# Patient Record
Sex: MALE | Race: WHITE | Employment: OTHER | URBAN - METROPOLITAN AREA
[De-identification: names, ages, dates, MRNs, and addresses within clinical notes are randomized per-mention and may not be internally consistent; named-entity substitution may affect disease eponyms.]

---

## 2019-04-21 ENCOUNTER — HOSPITAL ENCOUNTER (INPATIENT)
Age: 74
LOS: 1 days | Discharge: HOME | DRG: 389 | End: 2019-04-21
Attending: EMERGENCY MEDICINE | Admitting: SURGERY
Payer: MEDICARE

## 2019-04-21 ENCOUNTER — APPOINTMENT (OUTPATIENT)
Dept: GENERAL RADIOLOGY | Age: 74
DRG: 389 | End: 2019-04-21
Attending: STUDENT IN AN ORGANIZED HEALTH CARE EDUCATION/TRAINING PROGRAM
Payer: MEDICARE

## 2019-04-21 ENCOUNTER — APPOINTMENT (OUTPATIENT)
Dept: CT IMAGING | Age: 74
DRG: 389 | End: 2019-04-21
Attending: EMERGENCY MEDICINE
Payer: MEDICARE

## 2019-04-21 VITALS
TEMPERATURE: 98.1 F | RESPIRATION RATE: 18 BRPM | WEIGHT: 205 LBS | BODY MASS INDEX: 24.21 KG/M2 | SYSTOLIC BLOOD PRESSURE: 142 MMHG | OXYGEN SATURATION: 96 % | DIASTOLIC BLOOD PRESSURE: 80 MMHG | HEIGHT: 77 IN | HEART RATE: 90 BPM

## 2019-04-21 DIAGNOSIS — K56.609 SMALL BOWEL OBSTRUCTION (HCC): Primary | ICD-10-CM

## 2019-04-21 PROBLEM — D72.829 LEUCOCYTOSIS: Status: ACTIVE | Noted: 2019-04-21

## 2019-04-21 PROBLEM — R11.2 N&V (NAUSEA AND VOMITING): Status: ACTIVE | Noted: 2019-04-21

## 2019-04-21 PROBLEM — D3A.098 CARCINOID TUMOR OF ABDOMEN: Status: ACTIVE | Noted: 2019-04-21

## 2019-04-21 PROBLEM — R10.84 GENERALIZED ABDOMINAL PAIN: Status: ACTIVE | Noted: 2019-04-21

## 2019-04-21 PROBLEM — D68.9 COAGULOPATHY (HCC): Status: ACTIVE | Noted: 2019-04-21

## 2019-04-21 LAB
ALBUMIN SERPL-MCNC: 4 G/DL (ref 3.2–4.6)
ALBUMIN/GLOB SERPL: 1 {RATIO}
ALP SERPL-CCNC: 74 U/L (ref 50–136)
ALT SERPL-CCNC: 23 U/L (ref 12–65)
ANION GAP SERPL CALC-SCNC: 10 MMOL/L
AST SERPL-CCNC: 16 U/L (ref 15–37)
BASOPHILS # BLD: 0.1 K/UL (ref 0–0.2)
BASOPHILS NFR BLD: 0 % (ref 0–2)
BILIRUB SERPL-MCNC: 1.2 MG/DL (ref 0.2–1.1)
BUN SERPL-MCNC: 24 MG/DL (ref 8–23)
CALCIUM SERPL-MCNC: 9.8 MG/DL (ref 8.3–10.4)
CHLORIDE SERPL-SCNC: 103 MMOL/L (ref 98–107)
CO2 SERPL-SCNC: 25 MMOL/L (ref 21–32)
CREAT SERPL-MCNC: 1.15 MG/DL (ref 0.8–1.5)
DIFFERENTIAL METHOD BLD: ABNORMAL
EOSINOPHIL # BLD: 0.1 K/UL (ref 0–0.8)
EOSINOPHIL NFR BLD: 0 % (ref 0.5–7.8)
ERYTHROCYTE [DISTWIDTH] IN BLOOD BY AUTOMATED COUNT: 13.4 % (ref 11.9–14.6)
GLOBULIN SER CALC-MCNC: 4 G/DL (ref 2.3–3.5)
GLUCOSE SERPL-MCNC: 181 MG/DL (ref 65–100)
HCT VFR BLD AUTO: 43.9 % (ref 41.1–50.3)
HGB BLD-MCNC: 14.4 G/DL (ref 13.6–17.2)
IMM GRANULOCYTES # BLD AUTO: 0.1 K/UL (ref 0–0.5)
IMM GRANULOCYTES NFR BLD AUTO: 0 % (ref 0–5)
LACTATE BLD-SCNC: 1.2 MMOL/L (ref 0.5–1.9)
LIPASE SERPL-CCNC: 263 U/L (ref 73–393)
LYMPHOCYTES # BLD: 0.7 K/UL (ref 0.5–4.6)
LYMPHOCYTES NFR BLD: 4 % (ref 13–44)
MCH RBC QN AUTO: 31 PG (ref 26.1–32.9)
MCHC RBC AUTO-ENTMCNC: 32.8 G/DL (ref 31.4–35)
MCV RBC AUTO: 94.4 FL (ref 79.6–97.8)
MONOCYTES # BLD: 0.9 K/UL (ref 0.1–1.3)
MONOCYTES NFR BLD: 5 % (ref 4–12)
NEUTS SEG # BLD: 16.2 K/UL (ref 1.7–8.2)
NEUTS SEG NFR BLD: 90 % (ref 43–78)
NRBC # BLD: 0 K/UL (ref 0–0.2)
PLATELET # BLD AUTO: 281 K/UL (ref 150–450)
PMV BLD AUTO: 9.7 FL (ref 9.4–12.3)
POTASSIUM SERPL-SCNC: 3.4 MMOL/L (ref 3.5–5.1)
PROT SERPL-MCNC: 8 G/DL
RBC # BLD AUTO: 4.65 M/UL (ref 4.23–5.6)
SODIUM SERPL-SCNC: 138 MMOL/L (ref 136–145)
WBC # BLD AUTO: 18 K/UL (ref 4.3–11.1)

## 2019-04-21 PROCEDURE — 74177 CT ABD & PELVIS W/CONTRAST: CPT

## 2019-04-21 PROCEDURE — 87040 BLOOD CULTURE FOR BACTERIA: CPT

## 2019-04-21 PROCEDURE — 80053 COMPREHEN METABOLIC PANEL: CPT

## 2019-04-21 PROCEDURE — 96375 TX/PRO/DX INJ NEW DRUG ADDON: CPT | Performed by: EMERGENCY MEDICINE

## 2019-04-21 PROCEDURE — 99284 EMERGENCY DEPT VISIT MOD MDM: CPT | Performed by: EMERGENCY MEDICINE

## 2019-04-21 PROCEDURE — 77030020255 HC SOL INJ LR 1000ML BG

## 2019-04-21 PROCEDURE — 83690 ASSAY OF LIPASE: CPT

## 2019-04-21 PROCEDURE — 74018 RADEX ABDOMEN 1 VIEW: CPT

## 2019-04-21 PROCEDURE — 65270000029 HC RM PRIVATE

## 2019-04-21 PROCEDURE — 74011000258 HC RX REV CODE- 258: Performed by: EMERGENCY MEDICINE

## 2019-04-21 PROCEDURE — 83605 ASSAY OF LACTIC ACID: CPT

## 2019-04-21 PROCEDURE — 74011250636 HC RX REV CODE- 250/636: Performed by: EMERGENCY MEDICINE

## 2019-04-21 PROCEDURE — 85025 COMPLETE CBC W/AUTO DIFF WBC: CPT

## 2019-04-21 PROCEDURE — 74011636320 HC RX REV CODE- 636/320: Performed by: EMERGENCY MEDICINE

## 2019-04-21 PROCEDURE — 99285 EMERGENCY DEPT VISIT HI MDM: CPT | Performed by: EMERGENCY MEDICINE

## 2019-04-21 PROCEDURE — 77030008771 HC TU NG SALEM SUMP -A

## 2019-04-21 PROCEDURE — 74011250636 HC RX REV CODE- 250/636: Performed by: SURGERY

## 2019-04-21 PROCEDURE — 96365 THER/PROPH/DIAG IV INF INIT: CPT | Performed by: EMERGENCY MEDICINE

## 2019-04-21 RX ORDER — HYDROCHLOROTHIAZIDE 12.5 MG/1
25 TABLET ORAL DAILY
COMMUNITY

## 2019-04-21 RX ORDER — FAMOTIDINE 10 MG/ML
20 INJECTION INTRAVENOUS EVERY 12 HOURS
Status: DISCONTINUED | OUTPATIENT
Start: 2019-04-21 | End: 2019-04-21 | Stop reason: HOSPADM

## 2019-04-21 RX ORDER — ACETAMINOPHEN 650 MG/1
650 SUPPOSITORY RECTAL
Status: DISCONTINUED | OUTPATIENT
Start: 2019-04-21 | End: 2019-04-21 | Stop reason: HOSPADM

## 2019-04-21 RX ORDER — METFORMIN HYDROCHLORIDE 500 MG/1
850 TABLET ORAL 3 TIMES DAILY
COMMUNITY

## 2019-04-21 RX ORDER — ONDANSETRON 2 MG/ML
4 INJECTION INTRAMUSCULAR; INTRAVENOUS
Status: DISCONTINUED | OUTPATIENT
Start: 2019-04-21 | End: 2019-04-21 | Stop reason: HOSPADM

## 2019-04-21 RX ORDER — SODIUM CHLORIDE, SODIUM LACTATE, POTASSIUM CHLORIDE, CALCIUM CHLORIDE 600; 310; 30; 20 MG/100ML; MG/100ML; MG/100ML; MG/100ML
500 INJECTION, SOLUTION INTRAVENOUS CONTINUOUS
Status: DISCONTINUED | OUTPATIENT
Start: 2019-04-21 | End: 2019-04-21 | Stop reason: HOSPADM

## 2019-04-21 RX ORDER — ONDANSETRON 2 MG/ML
4 INJECTION INTRAMUSCULAR; INTRAVENOUS ONCE
Status: COMPLETED | OUTPATIENT
Start: 2019-04-21 | End: 2019-04-21

## 2019-04-21 RX ORDER — BISMUTH SUBSALICYLATE 262 MG
1 TABLET,CHEWABLE ORAL DAILY
COMMUNITY

## 2019-04-21 RX ORDER — ONDANSETRON 2 MG/ML
4 INJECTION INTRAMUSCULAR; INTRAVENOUS
Status: COMPLETED | OUTPATIENT
Start: 2019-04-21 | End: 2019-04-21

## 2019-04-21 RX ORDER — ENOXAPARIN SODIUM 100 MG/ML
40 INJECTION SUBCUTANEOUS EVERY 24 HOURS
Status: DISCONTINUED | OUTPATIENT
Start: 2019-04-22 | End: 2019-04-21 | Stop reason: HOSPADM

## 2019-04-21 RX ORDER — MORPHINE SULFATE 2 MG/ML
2 INJECTION, SOLUTION INTRAMUSCULAR; INTRAVENOUS
Status: DISCONTINUED | OUTPATIENT
Start: 2019-04-21 | End: 2019-04-21 | Stop reason: HOSPADM

## 2019-04-21 RX ORDER — SODIUM CHLORIDE 0.9 % (FLUSH) 0.9 %
10 SYRINGE (ML) INJECTION
Status: COMPLETED | OUTPATIENT
Start: 2019-04-21 | End: 2019-04-21

## 2019-04-21 RX ORDER — MORPHINE SULFATE 2 MG/ML
4 INJECTION, SOLUTION INTRAMUSCULAR; INTRAVENOUS
Status: COMPLETED | OUTPATIENT
Start: 2019-04-21 | End: 2019-04-21

## 2019-04-21 RX ORDER — CYANOCOBALAMIN 1000 UG/ML
1000 INJECTION, SOLUTION INTRAMUSCULAR; SUBCUTANEOUS
COMMUNITY

## 2019-04-21 RX ADMIN — FAMOTIDINE 20 MG: 10 INJECTION, SOLUTION INTRAVENOUS at 09:07

## 2019-04-21 RX ADMIN — SODIUM CHLORIDE, SODIUM LACTATE, POTASSIUM CHLORIDE, AND CALCIUM CHLORIDE 1000 ML: 600; 310; 30; 20 INJECTION, SOLUTION INTRAVENOUS at 06:04

## 2019-04-21 RX ADMIN — PIPERACILLIN SODIUM,TAZOBACTAM SODIUM 4.5 G: 4; .5 INJECTION, POWDER, FOR SOLUTION INTRAVENOUS at 07:58

## 2019-04-21 RX ADMIN — IOPAMIDOL 100 ML: 755 INJECTION, SOLUTION INTRAVENOUS at 06:53

## 2019-04-21 RX ADMIN — SODIUM CHLORIDE, SODIUM LACTATE, POTASSIUM CHLORIDE, AND CALCIUM CHLORIDE 125 ML/HR: 600; 310; 30; 20 INJECTION, SOLUTION INTRAVENOUS at 09:07

## 2019-04-21 RX ADMIN — Medication 10 ML: at 06:53

## 2019-04-21 RX ADMIN — MORPHINE SULFATE 4 MG: 2 INJECTION, SOLUTION INTRAMUSCULAR; INTRAVENOUS at 06:04

## 2019-04-21 RX ADMIN — ONDANSETRON 4 MG: 2 INJECTION INTRAMUSCULAR; INTRAVENOUS at 15:34

## 2019-04-21 RX ADMIN — ONDANSETRON 4 MG: 2 INJECTION INTRAMUSCULAR; INTRAVENOUS at 06:04

## 2019-04-21 RX ADMIN — SODIUM CHLORIDE 100 ML: 900 INJECTION, SOLUTION INTRAVENOUS at 06:53

## 2019-04-21 NOTE — ED NOTES
TRANSFER - OUT REPORT: 
 
Verbal report given to Humera(name) on Darylene Sheen  being transferred to 316(unit) for routine progression of care Report consisted of patients Situation, Background, Assessment and  
Recommendations(SBAR). Information from the following report(s) ED Summary was reviewed with the receiving nurse. Lines:  
Peripheral IV 04/21/19 Left Wrist (Active) Site Assessment Clean, dry, & intact 4/21/2019  6:15 AM  
Phlebitis Assessment 0 4/21/2019  6:15 AM  
Infiltration Assessment 0 4/21/2019  6:15 AM  
Dressing Type Transparent 4/21/2019  6:15 AM  
Hub Color/Line Status Blue 4/21/2019  6:15 AM  
   
Peripheral IV 04/21/19 Right Forearm (Active) Site Assessment Clean, dry, & intact 4/21/2019  7:45 AM  
Phlebitis Assessment 0 4/21/2019  7:45 AM  
Infiltration Assessment 0 4/21/2019  7:45 AM  
Dressing Status Clean, dry, & intact 4/21/2019  7:45 AM  
Dressing Type Transparent 4/21/2019  7:45 AM  
Hub Color/Line Status Pink 4/21/2019  7:45 AM  
Action Taken Blood drawn 4/21/2019  7:45 AM  
  
 
Opportunity for questions and clarification was provided. Patient transported with: 
 Registered Nurse

## 2019-04-21 NOTE — PROGRESS NOTES
NG tube removed. 150ml of brown output. IV removed. 1L of LR since up to floor. Patient emptied 2/3 bag full of soft stool from colostomy before leaving. Also said he passed gas.

## 2019-04-21 NOTE — PROGRESS NOTES
Patient vomiting. [address] surgical paged for order for zofran. No antiemetics are currently ordered

## 2019-04-21 NOTE — ED PROVIDER NOTES
Patient is a 79-year-old male who is coming in with some abdominal pain and vomiting. He states he started vomiting throughout the night and has had about 4 to 5 episodes. He states it was brown and he feels like he may be getting a bowel obstruction again. He has had 3 of these this year. He does have a history of having 3 colon resections and colostomy from colon cancer. He was most recently admitted in [place] as he is a snowbird and states there is some in the winter. He is here in [facility] and gets most of his care at the [address] [place] [address] where he lives. He states when he tended spelled tractions. He has not required any surgery just NG tube and they have improved. Past Medical History:  
Diagnosis Date  Diabetes ([address]  Gastrointestinal disorder   
 colon cancer  Hypertension Past Surgical History:  
Procedure Laterality Date  ABDOMEN SURGERY PROC UNLISTED    
 2016 abdominal surgery for colon ca  has ostomy  HX APPENDECTOMY  HX ORTHOPAEDIC    
 knee replacement, right knee History reviewed. No pertinent family history. Social History Socioeconomic History  Marital status:  Spouse name: Not on file  Number of children: Not on file  Years of education: Not on file  Highest education level: Not on file Occupational History  Not on file Social Needs  Financial resource strain: Not on file  Food insecurity:  
  Worry: Not on file Inability: Not on file  Transportation needs:  
  Medical: Not on file Non-medical: Not on file Tobacco Use  Smoking status: Current Every Day Smoker  Smokeless tobacco: Current User Substance and Sexual Activity  Alcohol use: Yes  Drug use: Not on file  Sexual activity: Not on file Lifestyle  Physical activity:  
  Days per week: Not on file Minutes per session: Not on file  Stress: Not on file Relationships  Social connections: Talks on phone: Not on file Gets together: Not on file Attends Hoahaoism service: Not on file Active member of club or organization: Not on file Attends meetings of clubs or organizations: Not on file Relationship status: Not on file  Intimate partner violence:  
  Fear of current or ex partner: Not on file Emotionally abused: Not on file Physically abused: Not on file Forced sexual activity: Not on file Other Topics Concern  Not on file Social History Narrative  Not on file ALLERGIES: Patient has no known allergies. Review of Systems Constitutional: Negative for chills and fever. Respiratory: Negative for chest tightness, shortness of breath, wheezing and stridor. Cardiovascular: Negative for chest pain, palpitations and leg swelling. Gastrointestinal: Negative for abdominal pain, diarrhea, nausea and vomiting. Musculoskeletal: Negative for arthralgias, back pain, neck pain and neck stiffness. Skin: Negative. Negative for color change and wound. Neurological: Negative for light-headedness. All other systems reviewed and are negative. Vitals:  
 04/21/19 8596 04/21/19 0544 BP: (!) 135/100 Pulse: 80 Resp: 14 Temp: 97 °F (36.1 °C) SpO2: 98% 99% Weight: 93 kg (205 lb) Height: 6' 5\" (1.956 m) Physical Exam  
Constitutional: He appears well-developed and well-nourished. Non-toxic appearance. He does not appear ill. No distress. HENT:  
Head: Normocephalic and atraumatic. Mouth/Throat: Oropharynx is clear and moist. No oropharyngeal exudate. Eyes: Pupils are equal, round, and reactive to light. Conjunctivae and EOM are normal. No scleral icterus. Neck: No tracheal deviation present. Cardiovascular: Normal rate, regular rhythm, normal heart sounds and intact distal pulses. Pulmonary/Chest: Effort normal. No stridor. No respiratory distress. He has no wheezes. Abdominal: Ostomy present no stool in the ostomy bad no surrounding cellulitis. Mild tenderness diffusely. No bowel sounds heard. Neurological: He is alert. Psychiatric: He has a normal mood and affect. His behavior is normal.  
Nursing note and vitals reviewed. MDM Number of Diagnoses or Management Options Diagnosis management comments: Patient has abdominal pain and vomiting. His white blood cell count is high and I have ordered cultures and antibiotics. For further evaluation I  have ordered a CT scan of his abdomen. He determine whether there is in fact another bowel obstruction. He states he is feeling better after the morphine and Zofran. Kal Adan MD; 4/21/2019 @6:47 AM Voice dictation software was used during the making of this note. This software is not perfect and grammatical and other typographical errors may be present. This note has not been proofread for errors. 
=================================================================== Amount and/or Complexity of Data Reviewed Clinical lab tests: ordered and reviewed Tests in the radiology section of CPT®: ordered and reviewed Procedures Transition of patient care at change of shift. This is a 82-year-old male with history of small bowel obstruction presenting to the emergency department with similar symptoms and previous obstruction. He has experienced abdominal pain, nausea and vomiting. Awaiting results of CT. Is a significant leukocytosis, status post Zosyn administration. CT imaging shows small bowel obstruction Call placed to general surgery for discussion and admission.  7:27 AM

## 2019-04-21 NOTE — ED NOTES
Jenny RN and Blaise Hoang RN have made 5 total attempts at NG tube. Have placed a 12 ga and had xray for placement verification. Tube appears to be placed to esophagus at which point it turn back up toward mouth. Humera SOTO notified.

## 2019-04-21 NOTE — ROUTINE PROCESS
TRANSFER - IN REPORT: 
 
Verbal report received from romy rn(name) on Giselel Santos  being received from ED(unit) for routine progression of care Report consisted of patients Situation, Background, Assessment and  
Recommendations(SBAR). Information from the following report(s) SBAR, Kardex and Recent Results was reviewed with the receiving nurse. Opportunity for questions and clarification was provided. Assessment completed upon patients arrival to unit and care assumed.

## 2019-04-21 NOTE — PROGRESS NOTES
69 yr-old MM with SBO, carcinoid tumor of abdomen. Hx abdominal malignancies and surgeries. From [place], is here playing golf with friends. Wants to go back to [place] to his doctors. Said he thinks Dr. Dianna Benoit will let him discharge and fly back to [place] and get admitted to the hospital there. Has flight out of [facility] and will ride with his friends. No needs from Social Work.

## 2019-04-21 NOTE — PROGRESS NOTES
D/c instructions given per Dr. Azra Atkinson. Pt voiced understanding of d/c plan Left PIV to right arm and NGT in for now, primary RN will remove when pt's ride gets here. Pt Will d/c with a friend at 0, go back to [place], and f/u at his hospital there. Pt was given records by Dr. Azra Atkinson.

## 2019-04-21 NOTE — DISCHARGE INSTRUCTIONS
DO NOT EAT OR DRINK ANYTHING  DO NOT DRIVE    REPORT DIRECTLY TO Мария Shin ON ARRIVAL IN [facility]  FOR ADMISSION. DISCHARGE SUMMARY from Nurse    PATIENT INSTRUCTIONS:    After general anesthesia or intravenous sedation, for 24 hours or while taking prescription Narcotics:  · Limit your activities  · Do not drive and operate hazardous machinery  · Do not make important personal or business decisions  · Do  not drink alcoholic beverages  · If you have not urinated within 8 hours after discharge, please contact your surgeon on call. Report the following to your surgeon:  · Excessive pain, swelling, redness or odor of or around the surgical area  · Temperature over 100.5  · Nausea and vomiting lasting longer than 4 hours or if unable to take medications  · Any signs of decreased circulation or nerve impairment to extremity: change in color, persistent  numbness, tingling, coldness or increase pain  · Any questions    What to do at Home:  Recommended activity: Activity as tolerated, see above     If you experience any of the following symptoms worsening nausea/vomiting, fever, chills, abdominal pain/distention, please follow up with [id number] *  Please give a list of your current medications to your Primary Care Provider. *  Please update this list whenever your medications are discontinued, doses are      changed, or new medications (including over-the-counter products) are added. *  Please carry medication information at all times in case of emergency situations. These are general instructions for a healthy lifestyle:    No smoking/ No tobacco products/ Avoid exposure to second hand smoke  Surgeon General's Warning:  Quitting smoking now greatly reduces serious risk to your health.     Obesity, smoking, and sedentary lifestyle greatly increases your risk for illness    A healthy diet, regular physical exercise & weight monitoring are important for maintaining a healthy lifestyle    You may be retaining fluid if you have a history of heart failure or if you experience any of the following symptoms:  Weight gain of 3 pounds or more overnight or 5 pounds in a week, increased swelling in our hands or feet or shortness of breath while lying flat in bed. Please call your doctor as soon as you notice any of these symptoms; do not wait until your next office visit. Recognize signs and symptoms of STROKE:    F-face looks uneven    A-arms unable to move or move unevenly    S-speech slurred or non-existent    T-time-call 911 as soon as signs and symptoms begin-DO NOT go       Back to bed or wait to see if you get better-TIME IS BRAIN. Warning Signs of HEART ATTACK     Call 911 if you have these symptoms:   Chest discomfort. Most heart attacks involve discomfort in the center of the chest that lasts more than a few minutes, or that goes away and comes back. It can feel like uncomfortable pressure, squeezing, fullness, or pain.  Discomfort in other areas of the upper body. Symptoms can include pain or discomfort in one or both arms, the back, neck, jaw, or stomach.  Shortness of breath with or without chest discomfort.  Other signs may include breaking out in a cold sweat, nausea, or lightheadedness. Don't wait more than five minutes to call 911 - MINUTES MATTER! Fast action can save your life. Calling 911 is almost always the fastest way to get lifesaving treatment. Emergency Medical Services staff can begin treatment when they arrive -- up to an hour sooner than if someone gets to the hospital by car. The discharge information has been reviewed with the patient. The patient verbalized understanding. Discharge medications reviewed with the patient and appropriate educational materials and side effects teaching were provided.   ___________________________________________________________________________________________________________________________________

## 2019-04-21 NOTE — H&P
H&P/Consult Note/Progress Note/Office Note:  
Victorino Grady  MRN: [id number]  :1945  Age:74 y.o. 
 
HPI: Victorino Grady is a 69 y.o. male with h/o multiple abdominal malignancies and multiple abd surgeries  
who presented to the ER with a 2 day h/o diffuse, constant, progressive, non-radiating abd pain. He was passing through [place] visiting friends and on way to home in [place]. Pain was associated with brown N/V. Nothing made it better or worse. He takes Eliquis daily for recurrent DVTs and last dose was in pm of 19 He is s/p IVF filter Last flatus and BM yesterday on 19 He has a h/o rectal carcinoma.  s/p neoadjuvant XRT and rectal resection with permanent colostomy. He also has a h/o recurrent carcinoid tumor of bowel. 2018 s/p colon resection and anast for carcinoid and 2nd separate small bowel resection with anast (he doesn't know details and no old records to review) 2018 2 additional small and large bowel resections for recurrent carcinoid He was admitted to a hospital in [place] while visiting there with SBO managed medically in 2019. He was admitted again 19-19 with SBO in [place] He reported he was discharged and re-admitted later that same day until approx 19. No surgery also that admission He is followed by Dr Rubin Rene (med onc) at [facility] on octreotide and Balbina Dakins (started in apropx 2019) He reports a known tumor on PET which is poorly localized and being followed  
No h/o MI, CVA, or coronary stent 19 CT abd/pelvis with IV contrast 
CT ABDOMEN:   
Limited evaluation of the lung bases and base of the mediastinum demonstrates 
calcified right hilar lymph nodes likely representing benign findings from 
chronic granulomatous infection.  
  
The Liver is homogeneous in attenuation. The spleen demonstrates multiple 
benign calcified granulomas.   No contour deforming or enhancing mass lesions are 
 seen of the pancreas or adrenal glands. The gallbladder has an unremarkable CT 
appearance without radiopaque stones or pericholecystic fluid/inflammatory 
changes. The kidneys enhance symmetrically and no evidence of hydronephrosis is 
seen.   
  
Abnormal small bowel dilation is seen with small bowel loops measuring up to 5.8 
cm in diameter. A focal abrupt transition point is seen on axial image 70, and 
coronal image 37 distal to which there is collapsed small bowel loops. The 
etiology of obstruction is not clearly evident. A left midabdomen diverting 
colostomy is seen. No free fluid, free air, or focal inflammatory changes are 
seen in the abdomen. No clear adenopathy is seen. A prominent central 
mesenteric lymph node is seen on image 68 measuring 12 mm short axis. The 
abdominal aorta demonstrates moderate atherosclerotic calcification. An IVC 
filter is present. 
  
CT PELVIS: 
No abnormal pelvic fluid collections or inflammatory changes are present. Presacral soft tissue density is seen which may represent treatment related 
scarring. Assessment of this finding is limited given the lack of prior 
comparison studies. No pelvic adenopathy is seen. The urinary bladder is 
unremarkable. 
  
IMPRESSION:   
1. Findings consistent with small bowel obstruction at the mid small bowel the 
etiology is not clearly evident. No free air is seen. 
  
2. Findings which are incompletely characterized without prior comparison 
studies. These include a mildly enlarged central mesenteric lymph node and 
presacral soft tissue density Past Medical History:  
Diagnosis Date  Diabetes ([address]  Gastrointestinal disorder   
 colon cancer  Hypertension Past Surgical History:  
Procedure Laterality Date  ABDOMEN SURGERY PROC UNLISTED    
 2016 abdominal surgery for colon ca  has ostomy  HX APPENDECTOMY  HX ORTHOPAEDIC    
 knee replacement, right knee Current Facility-Administered Medications Medication Dose Route Frequency  lactated Ringers infusion  125 mL/hr IntraVENous CONTINUOUS  
 morphine injection 2 mg  2 mg IntraVENous Q2H PRN  
 famotidine (PF) (PEPCID) injection 20 mg  20 mg IntraVENous Q12H Patient has no known allergies. Social History Socioeconomic History  Marital status:  Spouse name: Not on file  Number of children: Not on file  Years of education: Not on file  Highest education level: Not on file Tobacco Use  Smoking status: Current Every Day Smoker  Smokeless tobacco: Current User Substance and Sexual Activity  Alcohol use: Yes  
 
Social History Tobacco Use Smoking Status Current Every Day Smoker Smokeless Tobacco Current User History reviewed. No pertinent family history. ROS: The patient has no difficulty with chest pain or shortness of breath. No fever or chills. Comprehensive review of systems was otherwise unremarkable except as noted above. Physical Exam:  
Visit Vitals /80 Pulse 90 Temp 98.1 °F (36.7 °C) Resp 18 Ht 6' 5\" (1.956 m) Wt 205 lb (93 kg) SpO2 96% BMI 24.31 kg/m² Vitals:  
 04/21/19 0746 04/21/19 0747 04/21/19 0800 04/21/19 1203 BP: 123/76   142/80 Pulse:    90 Resp:    18 Temp:    98.1 °F (36.7 °C) SpO2:  96% 96% 96% Weight:      
Height:      
 
No intake/output data recorded. No intake/output data recorded. Constitutional: Alert, oriented, cooperative patient in no acute distress; appears stated age Eyes:Sclera are clear. EOMs intact ENMT: no external lesions gross hearing normal; no obvious neck masses, no ear or lip lesions, nares normal 
CV: RRR. Normal perfusion Resp: No JVD. Breathing is  non-labored; no audible wheezing. GI: feels full in RLQ, not tender (may have recently medicated), LLQ colostomy; multiple healed incisions; no guarding or rebound Musculoskeletal: unremarkable with normal function. No embolic signs or cyanosis. Neuro:  Oriented; moves all 4; no focal deficits Psychiatric: normal affect and mood, no memory impairment Recent vitals (if inpt): 
Patient Vitals for the past 24 hrs: 
 BP Temp Pulse Resp SpO2 Height Weight 04/21/19 1203 142/80 98.1 °F (36.7 °C) 90 18 96 %    
04/21/19 0800     96 %    
04/21/19 0747     96 %    
04/21/19 0746 123/76        
04/21/19 0544     99 %    
04/21/19 0529 (!) 135/100 97 °F (36.1 °C) 80 14 98 % 6' 5\" (1.956 m) 205 lb (93 kg) Labs: 
Recent Labs  
  04/21/19 
4351 WBC 18.0* HGB 14.4    
K 3.4*  
 CO2 25 BUN 24* CREA 1.15  
* TBILI 1.2* SGOT 16 ALT 23 AP 74 LPSE 263 Lab Results Component Value Date/Time WBC 18.0 (H) 04/21/2019 06:08 AM  
 HGB 14.4 04/21/2019 06:08 AM  
 PLATELET 042 44/11/8833 06:08 AM  
 Sodium 138 04/21/2019 06:08 AM  
 Potassium 3.4 (L) 04/21/2019 06:08 AM  
 Chloride 103 04/21/2019 06:08 AM  
 CO2 25 04/21/2019 06:08 AM  
 BUN 24 (H) 04/21/2019 06:08 AM  
 Creatinine 1.15 04/21/2019 06:08 AM  
 Glucose 181 (H) 04/21/2019 06:08 AM  
 Bilirubin, total 1.2 (H) 04/21/2019 06:08 AM  
 AST (SGOT) 16 04/21/2019 06:08 AM  
 ALT (SGPT) 23 04/21/2019 06:08 AM  
 Alk. phosphatase 74 04/21/2019 06:08 AM  
 Lipase 263 04/21/2019 06:08 AM  
 
 
CT Results  (Last 48 hours) 04/21/19 0701  CT ABD PELV W CONT Final result Impression:  IMPRESSION:    
1. Findings consistent with small bowel obstruction at the mid small bowel the  
etiology is not clearly evident. No free air is seen. 2. Findings which are incompletely characterized without prior comparison  
studies. These include a mildly enlarged central mesenteric lymph node and  
presacral soft tissue density. Narrative:  CT ABDOMEN AND PELVIS WITH INTRAVENOUS CONTRAST DATED 4/21/2019. History: Abdominal pain with vomiting this morning. History of colon cancer. Comparison:  
   
Technique:   Multiple contiguous helical CT images reconstructed at 5 mm  
intervals were obtained from above the diaphragms through the ischial  
tuberosities following oral and 100 cc Isovue-370 without acute complication. All CT scans performed at this facility use one or all of the following: Automated exposure control, adjustment of the mA and/or kVp according to  
patient's size, iterative reconstruction. Findings:  
CT ABDOMEN:    
Limited evaluation of the lung bases and base of the mediastinum demonstrates  
calcified right hilar lymph nodes likely representing benign findings from  
chronic granulomatous infection. The Liver is homogeneous in attenuation. The spleen demonstrates multiple  
benign calcified granulomas. No contour deforming or enhancing mass lesions are  
seen of the pancreas or adrenal glands. The gallbladder has an unremarkable CT  
appearance without radiopaque stones or pericholecystic fluid/inflammatory  
changes. The kidneys enhance symmetrically and no evidence of hydronephrosis is  
seen. Abnormal small bowel dilation is seen with small bowel loops measuring up to 5.8  
cm in diameter. A focal abrupt transition point is seen on axial image 70, and  
coronal image 37 distal to which there is collapsed small bowel loops. The  
etiology of obstruction is not clearly evident. A left midabdomen diverting  
colostomy is seen. No free fluid, free air, or focal inflammatory changes are  
seen in the abdomen. No clear adenopathy is seen. A prominent central  
mesenteric lymph node is seen on image 68 measuring 12 mm short axis. The  
abdominal aorta demonstrates moderate atherosclerotic calcification. An IVC  
filter is present. CT PELVIS:  
No abnormal pelvic fluid collections or inflammatory changes are present. Presacral soft tissue density is seen which may represent treatment related  
scarring. Assessment of this finding is limited given the lack of prior  
comparison studies. No pelvic adenopathy is seen. The urinary bladder is  
unremarkable. chest X-ray I reviewed recent labs, recent radiologic studies, and pertinent records including other doctor notes if needed. I independently reviewed radiology images for studies I described above or studies I have ordered. Admission date (for inpatients): 4/21/2019 * No surgery found *  * No surgery found * ASSESSMENT/PLAN: 
Problem List  Date Reviewed: 4/21/2019 Codes Class Noted * (Principal) SBO (small bowel obstruction) ([facility] [address] ICD-10-CM: F55.689 ICD-9-CM: 560.9  4/21/2019 N&V (nausea and vomiting) ICD-10-CM: R11.2 ICD-9-CM: 787.01  4/21/2019 Carcinoid tumor of abdomen ICD-10-CM: D3A.098 
ICD-9-CM: 209.69  4/21/2019 Coagulopathy ([facility] [address] secondary to Eliquis use (last dose 4/20/19) ICD-10-CM: D68.9 ICD-9-CM: 286.9  4/21/2019 Leucocytosis ICD-10-CM: L93.141 ICD-9-CM: 288.60  4/21/2019 Generalized abdominal pain ICD-10-CM: R10.84 ICD-9-CM: 789.07  4/21/2019 Principal Problem: 
  SBO (small bowel obstruction) ([facility] [address] (4/21/2019) Active Problems: 
  N&V (nausea and vomiting) (4/21/2019) Carcinoid tumor of abdomen (4/21/2019) Coagulopathy ([facility] [address] secondary to Eliquis use (last dose 4/20/19) (4/21/2019) Leucocytosis (4/21/2019) Generalized abdominal pain (4/21/2019) Leukocytosis Follow SBO in setting of known tumor and mult prior surgeries Took Eliquis last PM  
NGT-->LISx 
NPO He wants to get well enough to get on a plane and get home to his doctors in [place] 
I/Os GI and VTE prophylaxis with IV Pepcid, SCDs, and Lovenox I requested old records from [id number] I have personally performed a face-to-face diagnostic evaluation and management  service on this patient. I have independently seen the patient. I have independently obtained the above history from the patient/family. I have independently examined the patient with above findings. I have independently reviewed data/labs for this patient and developed the above plan of care (MDM). Signed: Salvador Thacker.  Caitlyn Garcia MD, FACS

## 2019-04-21 NOTE — PROGRESS NOTES
He has a ride to [facility] and a direct flight to home in [place] and wants to try to get back t his doctors at [address] [place] [address]. Will load with LR, give zofran 4mg IV x 1, and give pt labs/records/H&P (with labs in it) and Radiology disc with his CT. I will call his surgeons there to expect him and hopefully get him a direct admission bed

## 2019-04-26 LAB
BACTERIA SPEC CULT: NORMAL
BACTERIA SPEC CULT: NORMAL
SERVICE CMNT-IMP: NORMAL
SERVICE CMNT-IMP: NORMAL

## 2020-12-17 ENCOUNTER — APPOINTMENT (RX ONLY)
Dept: URBAN - METROPOLITAN AREA CLINIC 116 | Facility: CLINIC | Age: 75
Setting detail: DERMATOLOGY
End: 2020-12-17

## 2020-12-17 DIAGNOSIS — L70.8 OTHER ACNE: ICD-10-CM

## 2020-12-17 DIAGNOSIS — L82.1 OTHER SEBORRHEIC KERATOSIS: ICD-10-CM

## 2020-12-17 DIAGNOSIS — L81.4 OTHER MELANIN HYPERPIGMENTATION: ICD-10-CM

## 2020-12-17 DIAGNOSIS — D18.0 HEMANGIOMA: ICD-10-CM

## 2020-12-17 DIAGNOSIS — Z71.89 OTHER SPECIFIED COUNSELING: ICD-10-CM

## 2020-12-17 DIAGNOSIS — L57.0 ACTINIC KERATOSIS: ICD-10-CM

## 2020-12-17 PROBLEM — D18.01 HEMANGIOMA OF SKIN AND SUBCUTANEOUS TISSUE: Status: ACTIVE | Noted: 2020-12-17

## 2020-12-17 PROCEDURE — 99203 OFFICE O/P NEW LOW 30 MIN: CPT | Mod: 25

## 2020-12-17 PROCEDURE — 17000 DESTRUCT PREMALG LESION: CPT

## 2020-12-17 PROCEDURE — ? LIQUID NITROGEN

## 2020-12-17 PROCEDURE — ? COUNSELING

## 2020-12-17 ASSESSMENT — LOCATION DETAILED DESCRIPTION DERM
LOCATION DETAILED: LEFT INFERIOR CENTRAL MALAR CHEEK
LOCATION DETAILED: RIGHT MID-UPPER BACK
LOCATION DETAILED: LEFT INFERIOR MEDIAL MIDBACK
LOCATION DETAILED: RIGHT SUPERIOR HELIX
LOCATION DETAILED: RIGHT SUPERIOR UPPER BACK
LOCATION DETAILED: RIGHT SUPERIOR MEDIAL MIDBACK
LOCATION DETAILED: RIGHT INFERIOR FOREHEAD
LOCATION DETAILED: LEFT INFERIOR FOREHEAD
LOCATION DETAILED: LEFT MID-UPPER BACK

## 2020-12-17 ASSESSMENT — LOCATION SIMPLE DESCRIPTION DERM
LOCATION SIMPLE: RIGHT EAR
LOCATION SIMPLE: RIGHT LOWER BACK
LOCATION SIMPLE: RIGHT UPPER BACK
LOCATION SIMPLE: LEFT FOREHEAD
LOCATION SIMPLE: LEFT LOWER BACK
LOCATION SIMPLE: RIGHT FOREHEAD
LOCATION SIMPLE: LEFT CHEEK
LOCATION SIMPLE: LEFT UPPER BACK

## 2020-12-17 ASSESSMENT — LOCATION ZONE DERM
LOCATION ZONE: TRUNK
LOCATION ZONE: EAR
LOCATION ZONE: FACE

## 2020-12-17 NOTE — PROCEDURE: LIQUID NITROGEN
Post-Care Instructions: I reviewed with the patient in detail post-care instructions. Patient is to wear sunprotection, and avoid picking at any of the treated lesions. Pt may apply Vaseline to crusted or scabbing areas.
Consent: The patient's consent was obtained including but not limited to risks of crusting, scabbing, blistering, scarring, darker or lighter pigmentary change, recurrence, incomplete removal and infection.
Number Of Freeze-Thaw Cycles: 2 freeze-thaw cycles
Detail Level: Detailed
Duration Of Freeze Thaw-Cycle (Seconds): 3
Render Note In Bullet Format When Appropriate: No
Render Post-Care Instructions In Note?: yes

## 2020-12-17 NOTE — PROCEDURE: MIPS QUALITY
Detail Level: Generalized
Quality 111:Pneumonia Vaccination Status For Older Adults: Pneumococcal Vaccination Previously Received
Quality 130: Documentation Of Current Medications In The Medical Record: Eligible clinician attests to documenting in the medical record the patient is not eligible for a current list of medications being obtained, updated, or reviewed by the eligible clinician
Additional Notes: No medication list provided

## 2021-12-06 ENCOUNTER — APPOINTMENT (RX ONLY)
Dept: URBAN - METROPOLITAN AREA CLINIC 116 | Facility: CLINIC | Age: 76
Setting detail: DERMATOLOGY
End: 2021-12-06

## 2021-12-06 DIAGNOSIS — L89 PRESSURE ULCER: ICD-10-CM

## 2021-12-06 DIAGNOSIS — L81.4 OTHER MELANIN HYPERPIGMENTATION: ICD-10-CM

## 2021-12-06 DIAGNOSIS — D18.0 HEMANGIOMA: ICD-10-CM

## 2021-12-06 DIAGNOSIS — L57.0 ACTINIC KERATOSIS: ICD-10-CM

## 2021-12-06 DIAGNOSIS — L85.3 XEROSIS CUTIS: ICD-10-CM

## 2021-12-06 DIAGNOSIS — L82.1 OTHER SEBORRHEIC KERATOSIS: ICD-10-CM

## 2021-12-06 PROBLEM — L89.101 PRESSURE ULCER OF UNSPECIFIED PART OF BACK, STAGE 1: Status: ACTIVE | Noted: 2021-12-06

## 2021-12-06 PROBLEM — D18.01 HEMANGIOMA OF SKIN AND SUBCUTANEOUS TISSUE: Status: ACTIVE | Noted: 2021-12-06

## 2021-12-06 PROCEDURE — ? PRESCRIPTION MEDICATION MANAGEMENT

## 2021-12-06 PROCEDURE — ? LIQUID NITROGEN

## 2021-12-06 PROCEDURE — 17003 DESTRUCT PREMALG LES 2-14: CPT

## 2021-12-06 PROCEDURE — 17000 DESTRUCT PREMALG LESION: CPT

## 2021-12-06 PROCEDURE — 99213 OFFICE O/P EST LOW 20 MIN: CPT | Mod: 25

## 2021-12-06 PROCEDURE — ? COUNSELING

## 2021-12-06 ASSESSMENT — LOCATION DETAILED DESCRIPTION DERM
LOCATION DETAILED: RIGHT MEDIAL SUPERIOR CHEST
LOCATION DETAILED: RIGHT PROXIMAL DORSAL FOREARM
LOCATION DETAILED: RIGHT POSTERIOR EAR
LOCATION DETAILED: EPIGASTRIC SKIN
LOCATION DETAILED: LEFT PROXIMAL RADIAL DORSAL FOREARM
LOCATION DETAILED: NASAL DORSUM
LOCATION DETAILED: RIGHT INFERIOR MEDIAL FOREHEAD
LOCATION DETAILED: INFERIOR LUMBAR SPINE
LOCATION DETAILED: RIGHT SUPERIOR UPPER BACK

## 2021-12-06 ASSESSMENT — LOCATION SIMPLE DESCRIPTION DERM
LOCATION SIMPLE: CHEST
LOCATION SIMPLE: LEFT FOREARM
LOCATION SIMPLE: RIGHT FOREHEAD
LOCATION SIMPLE: NOSE
LOCATION SIMPLE: RIGHT EAR
LOCATION SIMPLE: LOWER BACK
LOCATION SIMPLE: RIGHT FOREARM
LOCATION SIMPLE: RIGHT UPPER BACK
LOCATION SIMPLE: ABDOMEN

## 2021-12-06 ASSESSMENT — LOCATION ZONE DERM
LOCATION ZONE: NOSE
LOCATION ZONE: ARM
LOCATION ZONE: TRUNK
LOCATION ZONE: FACE
LOCATION ZONE: EAR

## 2021-12-06 NOTE — PROCEDURE: LIQUID NITROGEN
Detail Level: Detailed
Duration Of Freeze Thaw-Cycle (Seconds): 0
Post-Care Instructions: I reviewed with the patient in detail post-care instructions. Patient is to wear sunprotection, and avoid picking at any of the treated lesions. Pt may apply Vaseline to crusted or scabbing areas.
Show Applicator Variable?: Yes
Render Note In Bullet Format When Appropriate: No
Number Of Freeze-Thaw Cycles: 2 freeze-thaw cycles
Consent: The patient's consent was obtained including but not limited to risks of crusting, scabbing, blistering, scarring, darker or lighter pigmentary change, recurrence, incomplete removal and infection.

## 2021-12-06 NOTE — PROCEDURE: PRESCRIPTION MEDICATION MANAGEMENT
Samples Given: Eucerin advance
Detail Level: Simple
Render In Strict Bullet Format?: No
Initiate Treatment: Try eucerin advance and amlactin
Initiate Treatment: Discontinue steroid cream. Apply aquaphor and duoderm
Samples Given: Duoderm and aquaphor

## 2022-07-30 ENCOUNTER — TELEPHONE ENCOUNTER (OUTPATIENT)
Age: 77
End: 2022-07-30

## 2022-07-31 ENCOUNTER — TELEPHONE ENCOUNTER (OUTPATIENT)
Age: 77
End: 2022-07-31

## 2022-07-31 RX ORDER — PSYLLIUM SEED
1 (ONE) PACKET (EA) ORAL
Qty: 0 | Refills: 2 | Status: ACTIVE | COMMUNITY
Start: 2019-02-01

## 2023-02-13 ENCOUNTER — APPOINTMENT (RX ONLY)
Dept: URBAN - METROPOLITAN AREA CLINIC 116 | Facility: CLINIC | Age: 78
Setting detail: DERMATOLOGY
End: 2023-02-13

## 2023-02-13 DIAGNOSIS — L81.4 OTHER MELANIN HYPERPIGMENTATION: ICD-10-CM

## 2023-02-13 DIAGNOSIS — D485 NEOPLASM OF UNCERTAIN BEHAVIOR OF SKIN: ICD-10-CM

## 2023-02-13 DIAGNOSIS — Z85.828 PERSONAL HISTORY OF OTHER MALIGNANT NEOPLASM OF SKIN: ICD-10-CM

## 2023-02-13 DIAGNOSIS — D18.0 HEMANGIOMA: ICD-10-CM

## 2023-02-13 DIAGNOSIS — S31000A OPEN WOUND(S) (MULTIPLE) OF UNSPECIFIED SITE(S), WITHOUT MENTION OF COMPLICATION: ICD-10-CM

## 2023-02-13 DIAGNOSIS — L82.1 OTHER SEBORRHEIC KERATOSIS: ICD-10-CM

## 2023-02-13 PROBLEM — D18.01 HEMANGIOMA OF SKIN AND SUBCUTANEOUS TISSUE: Status: ACTIVE | Noted: 2023-02-13

## 2023-02-13 PROBLEM — S81.801A UNSPECIFIED OPEN WOUND, RIGHT LOWER LEG, INITIAL ENCOUNTER: Status: ACTIVE | Noted: 2023-02-13

## 2023-02-13 PROBLEM — D48.5 NEOPLASM OF UNCERTAIN BEHAVIOR OF SKIN: Status: ACTIVE | Noted: 2023-02-13

## 2023-02-13 PROCEDURE — 11103 TANGNTL BX SKIN EA SEP/ADDL: CPT

## 2023-02-13 PROCEDURE — ? PHOTO-DOCUMENTATION

## 2023-02-13 PROCEDURE — 99213 OFFICE O/P EST LOW 20 MIN: CPT | Mod: 25

## 2023-02-13 PROCEDURE — ? COUNSELING

## 2023-02-13 PROCEDURE — ? BIOPSY BY SHAVE METHOD

## 2023-02-13 PROCEDURE — 11102 TANGNTL BX SKIN SINGLE LES: CPT

## 2023-02-13 PROCEDURE — ? ADDITIONAL NOTES

## 2023-02-13 PROCEDURE — ? TREATMENT REGIMEN

## 2023-02-13 ASSESSMENT — LOCATION SIMPLE DESCRIPTION DERM
LOCATION SIMPLE: LEFT PRETIBIAL REGION
LOCATION SIMPLE: LEFT UPPER BACK
LOCATION SIMPLE: ABDOMEN
LOCATION SIMPLE: UPPER BACK
LOCATION SIMPLE: CHEST
LOCATION SIMPLE: RIGHT CALF
LOCATION SIMPLE: RIGHT PRETIBIAL REGION
LOCATION SIMPLE: RIGHT UPPER BACK

## 2023-02-13 ASSESSMENT — LOCATION ZONE DERM
LOCATION ZONE: TRUNK
LOCATION ZONE: LEG

## 2023-02-13 ASSESSMENT — LOCATION DETAILED DESCRIPTION DERM
LOCATION DETAILED: RIGHT PROXIMAL LATERAL CALF
LOCATION DETAILED: LEFT SUPERIOR MEDIAL UPPER BACK
LOCATION DETAILED: RIGHT INFERIOR MEDIAL UPPER BACK
LOCATION DETAILED: EPIGASTRIC SKIN
LOCATION DETAILED: STERNUM
LOCATION DETAILED: INFERIOR THORACIC SPINE
LOCATION DETAILED: XIPHOID
LOCATION DETAILED: RIGHT LATERAL DISTAL PRETIBIAL REGION
LOCATION DETAILED: LEFT PROXIMAL PRETIBIAL REGION

## 2023-02-13 NOTE — HPI: EVALUATION OF SKIN LESION(S)
What Type Of Note Output Would You Prefer (Optional)?: Standard Output
Have Your Spot(S) Been Treated In The Past?: has not been treated
Hpi Title: Evaluation of Skin Lesions
Additional History: Patient states he had a spot removed on his right lower leg by [place] out of state dermatologist, patient states the area doesnât wanna seem to heal. He also states his up [facility] dermatologist removed something on his left arm back in September and did a procedure to remove the lesion completely.

## 2023-02-13 NOTE — PROCEDURE: TREATMENT REGIMEN
Samples Given: Aquaphor
Plan: Patient was advised to keep area moist with aquaphor and covered with a bandage.  RTC in 4 weeks if not improving
Detail Level: Simple

## 2023-02-13 NOTE — PROCEDURE: ADDITIONAL NOTES
Detail Level: Zone
Additional Notes: Biopsy aftercare handout provided
Render Risk Assessment In Note?: no

## 2023-02-13 NOTE — PROCEDURE: BIOPSY BY SHAVE METHOD
Detail Level: Detailed
Depth Of Biopsy: dermis
Was A Bandage Applied: Yes
Size Of Lesion In Cm: 0.6
X Size Of Lesion In Cm: 0
Biopsy Type: H and E
Biopsy Method: Personna blade
Anesthesia Type: 1% lidocaine with epinephrine
Hemostasis: Silver Nitrate
Wound Care: Petrolatum
Dressing: bandage
Destruction After The Procedure: No
Type Of Destruction Used: Curettage
Curettage Text: The wound bed was treated with curettage after the biopsy was performed.
Cryotherapy Text: The wound bed was treated with cryotherapy after the biopsy was performed.
Electrodesiccation Text: The wound bed was treated with electrodesiccation after the biopsy was performed.
Electrodesiccation And Curettage Text: The wound bed was treated with electrodesiccation and curettage after the biopsy was performed.
Silver Nitrate Text: The wound bed was treated with silver nitrate after the biopsy was performed.
Lab: [address] [place] [address] Vikas
Consent: The provider's intent is to obtain a tissue sample solely for diagnostic purposes. Written consent to obtain tissue sample was obtained and risks were reviewed including but not limited to scarring, infection, bleeding, scabbing, incomplete removal, nerve damage and allergy to anesthesia.
Post-Care Instructions: I reviewed with the patient in detail post-care instructions. Patient is to keep the biopsy site dry overnight, and then apply bacitracin twice daily until healed. Patient may apply hydrogen peroxide soaks to remove any crusting.
Notification Instructions: Patient will be notified of biopsy results. However, patient instructed to call the office if not contacted within 2 weeks.
Billing Type: [employer]
Information: Selecting Yes will display possible errors in your note based on the variables you have selected. This validation is only offered as a suggestion for you. PLEASE NOTE THAT THE VALIDATION TEXT WILL BE REMOVED WHEN YOU FINALIZE YOUR NOTE. IF YOU WANT TO FAX A PRELIMINARY NOTE YOU WILL NEED TO TOGGLE THIS TO 'NO' IF YOU DO NOT WANT IT IN YOUR FAXED NOTE.
Lab: [id number]
Billing Type: Third-Party Bill